# Patient Record
Sex: FEMALE | Race: WHITE | NOT HISPANIC OR LATINO | Employment: OTHER | URBAN - METROPOLITAN AREA
[De-identification: names, ages, dates, MRNs, and addresses within clinical notes are randomized per-mention and may not be internally consistent; named-entity substitution may affect disease eponyms.]

---

## 2017-02-21 PROBLEM — I25.119 CORONARY ARTERY DISEASE INVOLVING NATIVE CORONARY ARTERY OF NATIVE HEART WITH ANGINA PECTORIS: Status: ACTIVE | Noted: 2017-02-21

## 2017-02-21 PROBLEM — Z72.0 TOBACCO USE: Status: ACTIVE | Noted: 2017-02-21

## 2017-02-21 PROBLEM — Z95.1 S/P CABG (CORONARY ARTERY BYPASS GRAFT): Status: ACTIVE | Noted: 2017-02-21

## 2017-02-21 PROBLEM — Z95.5 S/P CORONARY ARTERY STENT PLACEMENT: Status: ACTIVE | Noted: 2017-02-21

## 2017-02-21 PROBLEM — R94.39 ABNORMAL CARDIOVASCULAR STRESS TEST: Status: ACTIVE | Noted: 2017-02-21

## 2017-02-21 PROBLEM — I25.2 HISTORY OF MI (MYOCARDIAL INFARCTION): Status: ACTIVE | Noted: 2017-02-21

## 2017-02-21 PROBLEM — R07.89 ATYPICAL CHEST PAIN: Status: ACTIVE | Noted: 2017-02-21

## 2017-02-21 PROBLEM — E11.40 DIABETIC NEUROPATHY: Status: ACTIVE | Noted: 2017-02-21

## 2017-03-13 ENCOUNTER — TELEPHONE (OUTPATIENT)
Dept: CARDIOLOGY | Facility: HOSPITAL | Age: 57
End: 2017-03-13

## 2017-03-17 PROBLEM — I73.9 SUBCLAVIAN ARTERY DISEASE: Status: ACTIVE | Noted: 2017-03-17

## 2017-04-25 PROBLEM — R94.39 ABNORMAL CARDIOVASCULAR STRESS TEST: Status: RESOLVED | Noted: 2017-02-21 | Resolved: 2017-04-25

## 2017-04-25 PROBLEM — I73.9 SUBCLAVIAN ARTERY DISEASE: Status: RESOLVED | Noted: 2017-03-17 | Resolved: 2017-04-25

## 2017-04-25 PROBLEM — R07.89 ATYPICAL CHEST PAIN: Status: RESOLVED | Noted: 2017-02-21 | Resolved: 2017-04-25

## 2017-04-25 PROBLEM — Z95.9 S/P ARTERIAL STENT: Status: ACTIVE | Noted: 2017-04-25

## 2017-04-25 PROBLEM — E11.9 TYPE 2 DIABETES MELLITUS: Status: ACTIVE | Noted: 2017-04-25

## 2017-04-25 PROBLEM — G72.0 STATIN MYOPATHY: Status: ACTIVE | Noted: 2017-04-25

## 2017-04-25 PROBLEM — I25.2 HX OF NON-ST ELEVATION MYOCARDIAL INFARCTION (NSTEMI): Status: RESOLVED | Noted: 2017-04-25 | Resolved: 2017-04-25

## 2017-04-25 PROBLEM — T46.6X5A STATIN MYOPATHY: Status: ACTIVE | Noted: 2017-04-25

## 2017-04-25 PROBLEM — I25.2 HX OF NON-ST ELEVATION MYOCARDIAL INFARCTION (NSTEMI): Status: ACTIVE | Noted: 2017-04-25

## 2017-08-13 PROBLEM — E11.9 TYPE 2 DIABETES MELLITUS: Status: RESOLVED | Noted: 2017-04-25 | Resolved: 2017-08-13

## 2017-08-13 PROBLEM — Z79.4 TYPE 2 DIABETES MELLITUS WITH CIRCULATORY DISORDER, WITH LONG-TERM CURRENT USE OF INSULIN: Status: ACTIVE | Noted: 2017-08-13

## 2017-08-13 PROBLEM — I25.83 CORONARY ARTERY DISEASE DUE TO LIPID RICH PLAQUE: Status: ACTIVE | Noted: 2017-02-21

## 2017-08-13 PROBLEM — E11.59 TYPE 2 DIABETES MELLITUS WITH CIRCULATORY DISORDER, WITH LONG-TERM CURRENT USE OF INSULIN: Status: ACTIVE | Noted: 2017-08-13

## 2017-08-13 PROBLEM — I25.10 CORONARY ARTERY DISEASE DUE TO LIPID RICH PLAQUE: Status: ACTIVE | Noted: 2017-02-21

## 2017-08-13 PROBLEM — I73.9 PAD (PERIPHERAL ARTERY DISEASE): Status: ACTIVE | Noted: 2017-08-13

## 2017-12-18 ENCOUNTER — TELEPHONE (OUTPATIENT)
Dept: ADMINISTRATIVE | Facility: HOSPITAL | Age: 57
End: 2017-12-18

## 2018-02-23 PROBLEM — R53.81 PHYSICAL DECONDITIONING: Status: ACTIVE | Noted: 2018-02-23

## 2018-08-23 PROBLEM — E11.9 TYPE 2 DIABETES MELLITUS WITHOUT COMPLICATION, WITH LONG-TERM CURRENT USE OF INSULIN: Status: ACTIVE | Noted: 2017-08-13

## 2018-08-23 PROBLEM — R80.9 TYPE 2 DIABETES MELLITUS WITH MICROALBUMINURIA, WITH LONG-TERM CURRENT USE OF INSULIN: Status: ACTIVE | Noted: 2017-08-13

## 2018-08-23 PROBLEM — E11.29 TYPE 2 DIABETES MELLITUS WITH MICROALBUMINURIA, WITH LONG-TERM CURRENT USE OF INSULIN: Status: ACTIVE | Noted: 2017-08-13

## 2019-02-06 PROBLEM — N30.00 ACUTE CYSTITIS WITHOUT HEMATURIA: Status: ACTIVE | Noted: 2019-02-06

## 2019-04-12 ENCOUNTER — PATIENT OUTREACH (OUTPATIENT)
Dept: ADMINISTRATIVE | Facility: HOSPITAL | Age: 59
End: 2019-04-12

## 2019-04-26 PROBLEM — K21.00 GASTROESOPHAGEAL REFLUX DISEASE WITH ESOPHAGITIS: Status: ACTIVE | Noted: 2019-04-26

## 2019-04-26 PROBLEM — G89.29 OTHER CHRONIC PAIN: Status: ACTIVE | Noted: 2019-04-26

## 2019-04-26 PROBLEM — N30.00 ACUTE CYSTITIS WITHOUT HEMATURIA: Status: RESOLVED | Noted: 2019-02-06 | Resolved: 2019-04-26

## 2019-04-26 PROBLEM — N32.81 OAB (OVERACTIVE BLADDER): Status: ACTIVE | Noted: 2019-04-26

## 2019-04-26 PROBLEM — M62.838 MUSCLE SPASM: Status: ACTIVE | Noted: 2019-04-26

## 2019-07-18 ENCOUNTER — PATIENT OUTREACH (OUTPATIENT)
Dept: ADMINISTRATIVE | Facility: HOSPITAL | Age: 59
End: 2019-07-18

## 2019-10-22 PROBLEM — G89.29 CHRONIC BILATERAL THORACIC BACK PAIN: Status: ACTIVE | Noted: 2019-10-22

## 2019-10-22 PROBLEM — G89.29 CHRONIC BILATERAL LOW BACK PAIN WITHOUT SCIATICA: Status: ACTIVE | Noted: 2019-10-22

## 2019-10-22 PROBLEM — M54.50 CHRONIC BILATERAL LOW BACK PAIN WITHOUT SCIATICA: Status: ACTIVE | Noted: 2019-10-22

## 2019-10-22 PROBLEM — M54.2 CERVICAL PAIN: Status: ACTIVE | Noted: 2019-10-22

## 2019-10-22 PROBLEM — M54.6 CHRONIC BILATERAL THORACIC BACK PAIN: Status: ACTIVE | Noted: 2019-10-22

## 2020-01-31 ENCOUNTER — NURSE TRIAGE (OUTPATIENT)
Dept: ADMINISTRATIVE | Facility: CLINIC | Age: 60
End: 2020-01-31

## 2020-02-01 NOTE — TELEPHONE ENCOUNTER
Patient c/i in reference to being almost out of her Lantus. Patient assumed she had refills at the pharmacy and per patient the patient thought the pharmacy said Lantus when instead the pharmacy had refills for Lancets. Dr. Rojas was the provider on call for Dr. Waters and report she will call in a refill for patient's Lantus. The patient wad advised per protocol and was advised to call back with any questions/concerns or symptoms. The patient verbalized understanding.     Reason for Disposition   Health Information question, no triage required and triager able to answer question    Additional Information   Negative: [1] Caller is not with the adult (patient) AND [2] reporting urgent symptoms   Negative: Lab result questions   Negative: Medication questions   Negative: Caller can't be reached by phone   Negative: Caller has already spoken to PCP or another triager   Negative: RN needs further essential information from caller in order to complete triage   Negative: Requesting regular office appointment   Negative: [1] Caller requesting NON-URGENT health information AND [2] PCP's office is the best resource    Protocols used: INFORMATION ONLY CALL-A-

## 2020-06-10 ENCOUNTER — PATIENT OUTREACH (OUTPATIENT)
Dept: ADMINISTRATIVE | Facility: HOSPITAL | Age: 60
End: 2020-06-10

## 2020-06-12 ENCOUNTER — TELEPHONE (OUTPATIENT)
Dept: ADMINISTRATIVE | Facility: HOSPITAL | Age: 60
End: 2020-06-12

## 2020-08-04 ENCOUNTER — TELEPHONE (OUTPATIENT)
Dept: ADMINISTRATIVE | Facility: HOSPITAL | Age: 60
End: 2020-08-04

## 2020-08-04 ENCOUNTER — PATIENT OUTREACH (OUTPATIENT)
Dept: ADMINISTRATIVE | Facility: HOSPITAL | Age: 60
End: 2020-08-04

## 2021-05-06 ENCOUNTER — PATIENT MESSAGE (OUTPATIENT)
Dept: RESEARCH | Facility: HOSPITAL | Age: 61
End: 2021-05-06

## 2021-06-01 ENCOUNTER — PATIENT OUTREACH (OUTPATIENT)
Dept: ADMINISTRATIVE | Facility: HOSPITAL | Age: 61
End: 2021-06-01

## 2022-02-16 ENCOUNTER — PATIENT MESSAGE (OUTPATIENT)
Dept: ADMINISTRATIVE | Facility: HOSPITAL | Age: 62
End: 2022-02-16

## 2022-03-04 DIAGNOSIS — Z12.11 COLON CANCER SCREENING: ICD-10-CM

## 2023-04-10 PROBLEM — E11.65 UNCONTROLLED TYPE 2 DIABETES MELLITUS WITH HYPERGLYCEMIA, WITH LONG-TERM CURRENT USE OF INSULIN: Status: ACTIVE | Noted: 2017-08-13

## 2023-06-27 ENCOUNTER — PATIENT OUTREACH (OUTPATIENT)
Dept: ADMINISTRATIVE | Facility: HOSPITAL | Age: 63
End: 2023-06-27

## 2023-07-10 ENCOUNTER — PATIENT MESSAGE (OUTPATIENT)
Dept: ADMINISTRATIVE | Facility: HOSPITAL | Age: 63
End: 2023-07-10

## 2023-08-30 DIAGNOSIS — E11.9 TYPE 2 DIABETES MELLITUS WITHOUT COMPLICATION: ICD-10-CM

## 2023-10-12 DIAGNOSIS — E11.29 TYPE 2 DIABETES MELLITUS WITH MICROALBUMINURIA, WITH LONG-TERM CURRENT USE OF INSULIN: ICD-10-CM

## 2023-10-12 DIAGNOSIS — R80.9 TYPE 2 DIABETES MELLITUS WITH MICROALBUMINURIA, WITH LONG-TERM CURRENT USE OF INSULIN: ICD-10-CM

## 2023-10-12 DIAGNOSIS — Z79.4 TYPE 2 DIABETES MELLITUS WITH MICROALBUMINURIA, WITH LONG-TERM CURRENT USE OF INSULIN: ICD-10-CM

## 2023-10-12 DIAGNOSIS — R73.9 HYPERGLYCEMIA: ICD-10-CM

## 2023-10-12 RX ORDER — INSULIN GLARGINE 100 [IU]/ML
INJECTION, SOLUTION SUBCUTANEOUS
Qty: 60 ML | Refills: 5 | Status: SHIPPED | OUTPATIENT
Start: 2023-10-12

## 2023-10-30 ENCOUNTER — PATIENT OUTREACH (OUTPATIENT)
Dept: ADMINISTRATIVE | Facility: HOSPITAL | Age: 63
End: 2023-10-30

## 2023-11-08 DIAGNOSIS — E11.9 TYPE 2 DIABETES MELLITUS WITHOUT COMPLICATION: ICD-10-CM

## 2023-11-08 DIAGNOSIS — Z12.11 COLON CANCER SCREENING: ICD-10-CM

## 2023-12-08 ENCOUNTER — PATIENT OUTREACH (OUTPATIENT)
Dept: ADMINISTRATIVE | Facility: HOSPITAL | Age: 63
End: 2023-12-08

## 2024-01-23 ENCOUNTER — PATIENT OUTREACH (OUTPATIENT)
Dept: ADMINISTRATIVE | Facility: HOSPITAL | Age: 64
End: 2024-01-23

## 2024-03-07 ENCOUNTER — PATIENT OUTREACH (OUTPATIENT)
Dept: ADMINISTRATIVE | Facility: HOSPITAL | Age: 64
End: 2024-03-07

## 2024-03-15 PROBLEM — I70.209 OCCLUSION OF COMMON FEMORAL ARTERY: Status: ACTIVE | Noted: 2024-03-15

## 2024-05-08 ENCOUNTER — PATIENT OUTREACH (OUTPATIENT)
Dept: ADMINISTRATIVE | Facility: HOSPITAL | Age: 64
End: 2024-05-08

## 2024-05-08 DIAGNOSIS — Z12.31 ENCOUNTER FOR SCREENING MAMMOGRAM FOR MALIGNANT NEOPLASM OF BREAST: Primary | ICD-10-CM

## 2024-05-08 DIAGNOSIS — Z78.0 MENOPAUSE: ICD-10-CM

## 2024-05-08 DIAGNOSIS — E11.9 TYPE 2 DIABETES MELLITUS WITHOUT COMPLICATION, WITHOUT LONG-TERM CURRENT USE OF INSULIN: ICD-10-CM

## 2024-06-04 ENCOUNTER — PATIENT OUTREACH (OUTPATIENT)
Dept: ADMINISTRATIVE | Facility: HOSPITAL | Age: 64
End: 2024-06-04

## 2024-06-14 ENCOUNTER — PATIENT OUTREACH (OUTPATIENT)
Dept: ADMINISTRATIVE | Facility: HOSPITAL | Age: 64
End: 2024-06-14

## 2024-08-08 ENCOUNTER — PATIENT OUTREACH (OUTPATIENT)
Dept: ADMINISTRATIVE | Facility: HOSPITAL | Age: 64
End: 2024-08-08

## 2024-09-06 ENCOUNTER — PATIENT OUTREACH (OUTPATIENT)
Dept: ADMINISTRATIVE | Facility: HOSPITAL | Age: 64
End: 2024-09-06

## 2024-12-06 ENCOUNTER — PATIENT MESSAGE (OUTPATIENT)
Dept: ADMINISTRATIVE | Facility: HOSPITAL | Age: 64
End: 2024-12-06

## 2024-12-06 ENCOUNTER — PATIENT OUTREACH (OUTPATIENT)
Dept: ADMINISTRATIVE | Facility: HOSPITAL | Age: 64
End: 2024-12-06

## 2025-03-29 PROBLEM — I70.221 CRITICAL LIMB ISCHEMIA OF RIGHT LOWER EXTREMITY: Status: ACTIVE | Noted: 2025-03-29

## 2025-03-29 PROBLEM — I96 GANGRENE OF TOE OF RIGHT FOOT: Status: ACTIVE | Noted: 2025-03-29

## 2025-03-29 PROBLEM — R63.6 UNDERWEIGHT (BMI < 18.5): Status: ACTIVE | Noted: 2025-03-29

## 2025-04-01 PROBLEM — E44.0 MODERATE PROTEIN-CALORIE MALNUTRITION: Status: ACTIVE | Noted: 2025-04-01

## 2025-06-19 PROBLEM — I63.9 ACUTE CVA (CEREBROVASCULAR ACCIDENT): Status: ACTIVE | Noted: 2025-06-19

## 2025-06-19 PROBLEM — R29.818 ACUTE FOCAL NEUROLOGICAL DEFICIT: Status: ACTIVE | Noted: 2025-06-19

## 2025-06-23 PROBLEM — E44.0 MODERATE MALNUTRITION: Status: ACTIVE | Noted: 2025-06-23

## 2025-06-23 PROBLEM — E44.0 MODERATE MALNUTRITION: Status: RESOLVED | Noted: 2025-06-23 | Resolved: 2025-06-23

## 2025-06-27 PROBLEM — J18.9 PNEUMONIA OF RIGHT LUNG DUE TO INFECTIOUS ORGANISM: Status: ACTIVE | Noted: 2025-06-27

## 2025-07-16 PROBLEM — K92.2 UPPER GI BLEED: Status: ACTIVE | Noted: 2025-07-16

## 2025-07-17 PROBLEM — R33.9 URINARY RETENTION: Status: ACTIVE | Noted: 2025-07-17

## 2025-07-17 PROBLEM — N17.9 AKI (ACUTE KIDNEY INJURY): Status: ACTIVE | Noted: 2025-07-17

## 2025-07-17 PROBLEM — F17.200 TOBACCO DEPENDENCY: Status: ACTIVE | Noted: 2025-07-17

## 2025-07-17 PROBLEM — N39.0 COMPLICATED URINARY TRACT INFECTION: Status: ACTIVE | Noted: 2025-07-17

## 2025-07-17 PROBLEM — E11.10 DKA (DIABETIC KETOACIDOSIS): Status: ACTIVE | Noted: 2025-07-17

## 2025-07-19 PROBLEM — E11.10 DKA (DIABETIC KETOACIDOSIS): Status: RESOLVED | Noted: 2025-07-17 | Resolved: 2025-07-19

## 2025-07-19 PROBLEM — F17.200 TOBACCO DEPENDENCY: Status: RESOLVED | Noted: 2025-07-17 | Resolved: 2025-07-19

## 2025-07-19 PROBLEM — N17.9 AKI (ACUTE KIDNEY INJURY): Status: RESOLVED | Noted: 2025-07-17 | Resolved: 2025-07-19

## 2025-07-22 ENCOUNTER — PATIENT OUTREACH (OUTPATIENT)
Dept: ADMINISTRATIVE | Facility: CLINIC | Age: 65
End: 2025-07-22
Payer: MEDICARE

## 2025-07-22 PROBLEM — Z86.73 HISTORY OF STROKE: Status: ACTIVE | Noted: 2025-06-19

## 2025-07-22 NOTE — PROGRESS NOTES
C3 nurse attempted to contact Maryam Davis for a TCC post hospital discharge follow up call. LVM . The patient had a scheduled Dr RIA Ortiz 7/22/25 1020am est

## 2025-07-23 NOTE — TELEPHONE ENCOUNTER
Pt friend Javy stated pt is taking Mirtazapine 15mg PRN. Message sent notifying PCP staff due to medication isn't listed in pt MAR.

## 2025-07-23 NOTE — PROGRESS NOTES
C3 nurse spoke with Maryam Davis and friend Javy for a TCC post hospital discharge follow up call. The patient completed a scheduled HOSFU appointment with Romel Waters MD  on 7/22/25.

## 2025-07-25 ENCOUNTER — NURSE TRIAGE (OUTPATIENT)
Dept: ADMINISTRATIVE | Facility: CLINIC | Age: 65
End: 2025-07-25
Payer: MEDICARE

## 2025-07-25 NOTE — TELEPHONE ENCOUNTER
Pts significant other calling about  at this time. Pt had just eaten breakfast and wondering what to do. Pt triaged and it was home care. Pt saw MD recently and care giver said that she was taken off of her medication for low BS. Pts is doing fine and no symptoms and rechecked BS about 30 min after eating and its down to 230. He is wondering what he needs to do. Pt told can drink some water and monitor but I will send the message to MD and endocrine to see what their advice would be.       Reason for Disposition   Blood glucose 240 - 300 mg/dL (13.3 - 16.7 mmol/L)    Additional Information   Negative: Unconscious or difficult to awaken   Negative: Acting confused (e.g., disoriented, slurred speech)   Negative: Very weak (can't stand)   Negative: Sounds like a life-threatening emergency to the triager   Negative: Vomiting and signs of dehydration (e.g., very dry mouth, lightheaded, dark urine)   Negative: Blood glucose > 240 mg/dL (13.3 mmol/L) and rapid breathing   Negative: Blood glucose > 500 mg/dL (27.8 mmol/L)   Negative: Blood glucose > 240 mg/dL (13.3 mmol/L) AND urine ketones moderate-large (or more than 1+)   Negative: Blood glucose > 240 mg/dL (13.3 mmol/L) and blood ketones > 1.4 mmol/L   Negative: Blood glucose > 240 mg/dL (13.3 mmol/L) AND vomiting AND unable to check for ketones (in blood or urine)   Negative: Vomiting lasting > 4 hours   Negative: Patient sounds very sick or weak to the triager   Negative: Fever > 100.4 F (38.0 C)   Negative: Caller has URGENT medication or insulin device (e.g., pump, continuous monitoring) question and triager unable to answer question   Negative: Blood glucose > 400 mg/dL (22.2 mmol/L)   Negative: Blood glucose > 300 mg/dL (16.7 mmol/L) AND two or more times in a row   Negative: Urine ketones moderate - large (or blood ketones > 1.4 mmol/L)   Negative: Symptoms of high blood sugar (e.g., increased thirst, frequent urination, weight loss) and not able to test  blood glucose, and pregnant   Negative: Blood glucose > 240 mg/dL (13.3 mmol/L) AND pregnant   Negative: Symptoms of high blood sugar (e.g., increased thirst, frequent urination, weight loss) and not able to test blood glucose   Negative: New-onset diabetes mellitus suspected (e.g., increased thirst, frequent urination, weight loss)   Negative: Patient wants to be seen   Negative: Caller has NON-URGENT medication or insulin device (e.g., pump, continuous monitoring) question and triager unable to answer question   Negative: Blood glucose > 300 mg/dL (16.7 mmol/L)    Protocols used: Diabetes - High Blood Sugar-A-OH

## 2025-07-25 NOTE — TELEPHONE ENCOUNTER
Returned call to Mr Palafox. Patient still denies symptoms. He will recheck her blood sugar in 30 minutes and see if its trending down. Advised him to make sure she is staying hydrated and to try applying her Dexcom sensor. Also let him know that if her blood sugar is checked immediately after eating it will most likely be elevated. They are trying to get HH nurse out to check her wound. He has been keeping clean with normal saline and wrapping as advised in hospital. No signs of infection noted. I let him know to report to ED for signs of infection. Or if she develops fever. He verbalized understanding.

## 2025-07-31 ENCOUNTER — NURSE TRIAGE (OUTPATIENT)
Dept: ADMINISTRATIVE | Facility: CLINIC | Age: 65
End: 2025-07-31
Payer: MEDICARE

## 2025-07-31 NOTE — TELEPHONE ENCOUNTER
Speaking with house mate. States patient has blood in her urine. This started this am. States her urine in the bag is red colored. It is now starting to clear. States she is drinking water now. Denies fever or abdominal pain. Triage done - dispo home care. Verb understanding to call back for pain or fever or not clearing. Care giver informed of in person appointment for today with cardiology as a new patient for follow up. He was unaware. Time, location and provider verified with patient's caregiver. Verb understanding.   Reason for Disposition   Pink, slightly red, or tea-colored urine, present < 24 hours    Additional Information   Negative: Shock suspected (e.g., cold/pale/clammy skin, too weak to stand, low BP, rapid pulse)   Negative: Sounds like a life-threatening emergency to the triager   Negative: SEVERE abdominal pain   Negative: Catheter was accidentally pulled-out and bright red continuous bleeding   Negative: Fever > 100.4 F (38.0 C)   Negative: Drinking very little and dehydration suspected (e.g., no urine > 12 hours, very dry mouth, very lightheaded)   Negative: Patient sounds very sick or weak to the triager   Negative: Catheter was accidentally pulled-out   Negative: Catheter is broken and is not working (does not function normally)   Negative: Bleeding around catheter (e.g., from penis or female urethra)   Negative: New-onset MILD - MODERATE lower abdominal pain or swelling (distention)   Negative: Pink, slightly red, or tea-colored urine lasts > 24 hours that is not cleared by increased fluid intake, and no recent prostate or bladder surgery   Negative: Cloudy urine lasts > 24 hours and not cleared by increased fluid intake   Negative: Bloody or red-colored urine and no recent prostate or bladder surgery  (Exception: Brief episode and urine now clear.)   Negative: Bloody or red-colored urine and prostate or bladder surgery > 3 days (72 hours) ago   Negative: No urine in bag for > 4 hours and  catheter is not kinked   Negative: Foul-smelling urine (urine smells bad)   Negative: Leakage of urine around catheter   Negative: Catheter is broken or cracked and still works (functioning normally)   Negative: Patient wants to be seen   Negative: Urinary catheter care, questions about    Protocols used: Urinary Catheter (e.g., Anderson) Symptoms and [id number]

## 2025-08-02 PROBLEM — F17.200 TOBACCO DEPENDENCY: Status: ACTIVE | Noted: 2025-08-02

## 2025-08-12 ENCOUNTER — PATIENT OUTREACH (OUTPATIENT)
Dept: ADMINISTRATIVE | Facility: HOSPITAL | Age: 65
End: 2025-08-12
Payer: MEDICARE

## 2025-08-12 PROBLEM — T87.89 NONHEALING AMPUTATION STUMP: Status: ACTIVE | Noted: 2025-08-12

## 2025-08-13 PROBLEM — E43 SEVERE MALNUTRITION: Status: ACTIVE | Noted: 2025-08-13

## 2025-08-20 PROBLEM — Z79.4 UNCONTROLLED TYPE 2 DIABETES MELLITUS WITH HYPERGLYCEMIA, WITH LONG-TERM CURRENT USE OF INSULIN: Chronic | Status: ACTIVE | Noted: 2017-08-13

## 2025-08-20 PROBLEM — E11.65 UNCONTROLLED TYPE 2 DIABETES MELLITUS WITH HYPERGLYCEMIA, WITH LONG-TERM CURRENT USE OF INSULIN: Chronic | Status: ACTIVE | Noted: 2017-08-13

## 2025-09-05 ENCOUNTER — DOCUMENT SCAN (OUTPATIENT)
Dept: HOME HEALTH SERVICES | Facility: HOSPITAL | Age: 65
End: 2025-09-05
Payer: MEDICARE